# Patient Record
(demographics unavailable — no encounter records)

---

## 2025-01-09 NOTE — HISTORY OF PRESENT ILLNESS
[FreeTextEntry1] : Mr. Guanako Mesa is a 73M with PMH HTN, DM, HLD, CAD on plavix, afib previously on Eliquis who presents today for hospital follow up. To review, he was originally admitted to [facility] 11/21 - 11/23 for R occipital infarct and again 11/29 s/p slip and fall. He then re-presented on 11/30/24 with confusion and AMS. 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 He was transferred to [facility] for evaluation, started on a heparin drip. He was found to have a pressure-dependent exam, improved exam with SBP >160. MRI performed which showed watershed territory infarcts, and MRA NOVA showed decreased velocities. He underwent R petrous carotid angioplasty and stenting on 12/2/24. He tolerated the procedure well. Postop MRI showed stable infarcts, and MRA NOVA showed greatly improved velocities. He was discharged to acute rehab on 12/6/24. He presents today for follow up. He continues on aspirin and plavix, reports compliance. He reports that he feels well, reports his L arm and leg are much stronger but still continues with some L arm weakness. He also admits to eye "strain" but thinks he just needs to see an ophthalmologist. He denies headache, weakness, numbness, tingling, difficulty walking, difficulty speaking, dizziness.

## 2025-01-09 NOTE — ASSESSMENT
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   Plan:  - Continue aspirin and plavix  - Ok to restart Eliquis for afib if required by cardiology, if NOVA stable then will switch to Plavix and Eliquis at that time   - Repeat MRA NOVA in 2 months (March 2025)  - Follow up after imaging performed  - Plan for repeat angiogram in 6 months (June 2025)  - Referral to Dr. Wayne David for neuroophthalmology  - Referral to [facility] for cardiology workup  - Patient and family in agreement with plan.

## 2025-01-09 NOTE — ASSESSMENT
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   Plan:  - Continue aspirin and plavix  - Ok to restart Eliquis for afib if required by cardiology, if NOVA stable then will switch to Plavix and Eliquis at that time   - Repeat MRA NOVA in 2 months (March 2025)  - Follow up after imaging performed  - Plan for repeat angiogram in 6 months (June 2025)  - Referral to Dr. Wayne David for neuroophthalmology  - Referral to [facility] for cardiology workup  - Patient and family in agreement with plan.

## 2025-01-09 NOTE — END OF VISIT
[FreeTextEntry3] : Mr. Mesa was seen in follow-up today after his intracranial angioplasty and stenting for his right petrous cavernous stenosis.  He is doing very well after his physical therapy.  He has very mild left-sided hemiparesis.  He will need a NOVA in 2 months and he will follow-up in the office after that is complete.  I advised him to continue his aspirin and Plavix for his newly placed stent however if he needs to be on anticoagulation for atrial fibrillation he will need to be on triple antithrombotic therapy for at least 2 months until we are able to review the NOVA scan. [Time Spent: ___ minutes] : I have spent [unfilled] minutes of time on the encounter which excludes teaching and separately reported services.

## 2025-01-09 NOTE — ASSESSMENT
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   Plan:  - Continue aspirin and plavix  - Ok to restart Eliquis for afib if required by cardiology, if NOVA stable then will switch to Plavix and Eliquis at that time   - Repeat MRA NOVA in 2 months (March 2025)  - Follow up after imaging performed  - Plan for repeat angiogram in 6 months (June 2025)  - Referral to Dr. Wayne David for neuroophthalmology  - Referral to [facility] for cardiology workup  - Patient and family in agreement with plan.

## 2025-01-09 NOTE — PHYSICAL EXAM
[FreeTextEntry1] : Constitutional: NAD Neuro * Mental Status:  GCS 15: Awake, alert, oriented to conversation. No aphasia or difficulty speaking. No dysarthria. * Cranial Nerves: Cnii-Cnxii grossly intact. EOMI, tongue midline, no gaze deviation, no facial droop * Motor: LUE 4+/5, LLE 5/5, RUE/RLE 5/5  * Sensory: Sensation intact to light touch * Reflexes: not assessed Cardiovascular: Regular rate and rhythm. Eyes: See neurologic examination with detailed examination of eyes. Respiratory: non labored breathing Musculoskeletal: No muscle wasting noted Skin: grossly intact

## 2025-01-09 NOTE — HISTORY OF PRESENT ILLNESS
[FreeTextEntry1] : Mr. Guanako Mesa is a 73M with PMH HTN, DM, HLD, CAD on plavix, afib previously on Eliquis who presents today for hospital follow up. To review, he was originally admitted to [facility] 11/21 - 11/23 for R occipital infarct and again 11/29 s/p slip and fall. He then re-presented on 11/30/24 with confusion and AMS. 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 He was transferred to [facility] for evaluation, started on a heparin drip. He was found to have a pressure-dependent exam, improved exam with SBP >160. MRI performed which showed watershed territory infarcts, and MRA NOVA showed decreased velocities. He underwent R petrous carotid angioplasty and stenting on 12/2/24. He tolerated the procedure well. Postop MRI showed stable infarcts, and MRA NOVA showed greatly improved velocities. He was discharged to acute rehab on 12/6/24. He presents today for follow up. He continues on aspirin and plavix, reports compliance. He reports that he feels well, reports his L arm and leg are much stronger but still continues with some L arm weakness. He also admits to eye "strain" but thinks he just needs to see an ophthalmologist. He denies headache, weakness, numbness, tingling, difficulty walking, difficulty speaking, dizziness.

## 2025-01-09 NOTE — HISTORY OF PRESENT ILLNESS
[FreeTextEntry1] : Mr. Guaanko Mesa is a 73M with PMH HTN, DM, HLD, CAD on plavix, afib previously on Eliquis who presents today for hospital follow up. To review, he was originally admitted to [facility] 11/21 - 11/23 for R occipital infarct and again 11/29 s/p slip and fall. He then re-presented on 11/30/24 with confusion and AMS. 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 He was transferred to [facility] for evaluation, started on a heparin drip. He was found to have a pressure-dependent exam, improved exam with SBP >160. MRI performed which showed watershed territory infarcts, and MRA NOVA showed decreased velocities. He underwent R petrous carotid angioplasty and stenting on 12/2/24. He tolerated the procedure well. Postop MRI showed stable infarcts, and MRA NOVA showed greatly improved velocities. He was discharged to acute rehab on 12/6/24. He presents today for follow up. He continues on aspirin and plavix, reports compliance. He reports that he feels well, reports his L arm and leg are much stronger but still continues with some L arm weakness. He also admits to eye "strain" but thinks he just needs to see an ophthalmologist. He denies headache, weakness, numbness, tingling, difficulty walking, difficulty speaking, dizziness.

## 2025-01-10 NOTE — REVIEW OF SYSTEMS
[Constipation] : constipation [Negative] : Heme/Lymph [Chest Pain] : no chest pain [Palpitations] : no palpitations [Lower Ext Edema] : no lower extremity edema [Orthopena] : no orthopnea [Paroxysmal Nocturnal Dyspnea] : no paroxysmal nocturnal dyspnea [Shortness Of Breath] : no shortness of breath [Wheezing] : no wheezing [Cough] : no cough [Dyspnea on Exertion] : not dyspnea on exertion [Abdominal Pain] : no abdominal pain [Nausea] : no nausea [Vomiting] : no vomiting [Heartburn] : no heartburn [Melena] : no melena [Dysuria] : no dysuria [Incontinence] : no incontinence [Hesitancy] : no hesitancy [Nocturia] : no nocturia [Hematuria] : no hematuria [Frequency] : no frequency [Impotence] : no impotency [Poor Libido] : libido not poor [Joint Pain] : no joint pain [Back Pain] : no back pain [FreeTextEntry7] : uses senna PRN

## 2025-01-10 NOTE — HISTORY OF PRESENT ILLNESS
[House Calls Co-Management Patient] : [unfilled] is a House [employer]s co-management patient [Patient is stable] : patient is stable [Education provided] : education provided [Patient] : patient [Spouse] : spouse [LastPVisitDate] : 12/2024 [FreeTextEntry4] : Dr. Dunn [LastSpecialistVisitDate] : 01/2025 [FreeTextEntry1] : Dr. Bharat Kowalski cardiology Neuro ophthalmology Dr. Wayne Dunn 3/13/25 (neuro Sx)   [FreeTextEntry2] : MO AMES is a 73 year male with a PMHX of stroke, fall, brain bleed, angioplasty, AFib, HTN, bypass surgery, stent placement,    Patient is being seen for new patient   [ ] accompany patient during today's visit.  AOx3, denies SOB, CP or dizziness, no changes in bladder and bowel functions.  Today's visit and Review - 01/10/2025 - Saw Neurosurgeon yesterday - recent angioplasty s/p brain bleed subsequent to a fall caused by a stroke - Recent A1c 7 - Blood labs to be drawn Monday 1/13 - denies numbness and tingling of hands and feet - denies back pain - constipation, uses Senna, advised to try prune juice and Miralax qam for 7 days - Will be obtaining medical records from Dr. Vergara - elevated BP today, advised to inform caridologist or PCP -    # Ambulation - without assistive devices # Cognition and memory: # Mood: # Communication: # Appetite:  <> Dysphagia: # Sleep hygiene: 6-8 hours good sleep, no snoring # BM pattern: stool is soft, no blood in it, constipation (Senna) # Urinary: denies increased frequency, pain with urination, change to color or odor of urine # Skin # Sensory deficits: mildly [place], does not use hearing aids # Compliance   #Social   #DMEs     Review of systems otherwise NEGATIVE.   # Chronic disease     # Specialists Dr. Wayne David (Opthamologist - [place]) Dr. Dunn (Neurosurgeon) Dr. Vergara (Cardiologist - [facility])

## 2025-01-10 NOTE — ADDENDUM
[FreeTextEntry1] : Documented by Mirna Bearden acting as a scribe under Miriam Álvarez NP. 01/10/2025

## 2025-01-10 NOTE — END OF VISIT
[FreeTextEntry3] : Documented by Mirna Bearden acting as a scribe for Miriam Álvarez NP. 01/10/2025   All medical record entries made by the Scribe were at my direction and personally dictated by me on 01/10/2025. I have reviewed the chart and agree that the record accurately reflects my personal performance of the history, physical exam, assessment and plan. I have also personally directed, reviewed, and agreed with the chart.

## 2025-03-13 NOTE — HISTORY OF PRESENT ILLNESS
[FreeTextEntry1] : Mr. Guanako Mesa is a 74M with PMH HTN, DM, HLD, CAD on plavix, afib previously on Eliquis who presents today for follo up R carotid angioplasty and stenting. To review, he was originally admitted to [facility] 11/21 - 11/23 for R occipital infarct and again 11/29 s/p slip and fall. 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 He was transferred to [facility] and was found to have a pressure-dependent exam, improved exam with SBP >160. MRI showed watershed territory infarcts, and MRA NOVA showed R ICA decreased velocities. He underwent R petrous carotid angioplasty and stenting on 12/2/24. He tolerated the procedure well. Postop MRI showed stable infarcts, and MRA NOVA showed greatly improved velocities. He was discharged to acute rehab on 12/6/24. He presents today for follow up. He continues on aspirin and plavix. He reports that he feels well, reports that his L side weakness has improved, denies headache, weakness, numbness, tingling, difficulty walking, difficulty speaking, dizziness. He reports that he has cataract surgery planned for May 8 and May 22.

## 2025-03-13 NOTE — HISTORY OF PRESENT ILLNESS
[FreeTextEntry1] : Mr. Guanako Mesa is a 74M with PMH HTN, DM, HLD, CAD on plavix, afib previously on Eliquis who presents today for follo up R carotid angioplasty and stenting. To review, he was originally admitted to [facility] 11/21 - 11/23 for R occipital infarct and again 11/29 s/p slip and fall. 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 He was transferred to [facility] and was found to have a pressure-dependent exam, improved exam with SBP >160. MRI showed watershed territory infarcts, and MRA NOVA showed R ICA decreased velocities. He underwent R petrous carotid angioplasty and stenting on 12/2/24. He tolerated the procedure well. Postop MRI showed stable infarcts, and MRA NOVA showed greatly improved velocities. He was discharged to acute rehab on 12/6/24. He presents today for follow up. He continues on aspirin and plavix. He reports that he feels well, reports that his L side weakness has improved, denies headache, weakness, numbness, tingling, difficulty walking, difficulty speaking, dizziness. He reports that he has cataract surgery planned for May 8 and May 22.

## 2025-03-13 NOTE — PHYSICAL EXAM
[FreeTextEntry1] : Constitutional: NAD Neuro * Mental Status:  GCS 15: Awake, alert, oriented to conversation. No aphasia or difficulty speaking. No dysarthria. * Cranial Nerves: Cnii-Cnxii grossly intact. EOMI, tongue midline, no gaze deviation, no facial droop * Motor: LUE 4+/5, LUE/RUE/RLE 5/5  * Sensory: Sensation intact to light touch * Reflexes: not assessed Cardiovascular: Regular rate and rhythm. Eyes: See neurologic examination with detailed examination of eyes. Respiratory: non labored breathing Musculoskeletal: No muscle wasting noted Skin: grossly intact

## 2025-03-13 NOTE — ASSESSMENT
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   Plan: - Continue aspirin and plavix - CTA now to assess stent patency  - Repeat cerebral angiogram Monday, April 28, 2025 - PST prior to procedure  - Patient and family in agreement with plan.

## 2025-03-13 NOTE — END OF VISIT
[FreeTextEntry3] : Will obtain 3 months CTA due to difficulties with scheduling a MR NOVA.  He is compliant on aspirin and Plavix.  We will perform cerebral angiography ahead of his planned cataract surgery date due to the need to discontinue aspirin and Plavix in order to ensure no evidence of in-stent stenosis.  The patient was in agreement with this plan and all questions were answered. [Time Spent: ___ minutes] : I have spent [unfilled] minutes of time on the encounter which excludes teaching and separately reported services.

## 2025-04-11 NOTE — PHYSICAL EXAM
[FreeTextEntry1] : Constitutional: NAD Neuro * Mental Status: GCS 15: Awake, alert, oriented to conversation. No aphasia or difficulty speaking. No dysarthria. * Cranial Nerves: Cnii-Cnxii grossly intact. EOMI, tongue midline, no gaze deviation, no facial droop * Motor: LUE 4+/5, LUE/RUE/RLE 5/5 * Sensory: Sensation intact to light touch * Reflexes: not assessed Cardiovascular: Regular rate and rhythm. Eyes: See neurologic examination with detailed examination of eyes. Respiratory: non labored breathing Musculoskeletal: No muscle wasting noted Skin: grossly intact.

## 2025-04-11 NOTE — HISTORY OF PRESENT ILLNESS
[FreeTextEntry1] : Mr. Guanako Mesa is a 74M with PMH HTN, DM, HLD, CAD on plavix, afib previously on Eliquis who presents today for follo up R carotid angioplasty and stenting. To review, he was originally admitted to [facility] 11/21 - 11/23 for R occipital infarct and again 11/29 s/p slip and fall. 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 He was transferred to [facility] and was found to have a pressure-dependent exam, improved exam with SBP >160. MRI showed watershed territory infarcts, and MRA NOVA showed R ICA decreased velocities. He underwent R petrous carotid angioplasty and stenting on 12/2/24. He tolerated the procedure well. Postop MRI showed stable infarcts, and MRA NOVA showed greatly improved velocities. He was discharged to acute rehab on 12/6/24. He presents today for follow up. He continues on aspirin and plavix. He underwent a CTA which showed good flow through the stent without evidence of stenosis. He presents today for follow up of results. He reports that he feels well, continues with some deficits to the L visual field when reading but otherwise denies headache, weakness, numbness, tingling, difficulty walking, difficulty speaking, dizziness. He is compliant on aspirin and plavix.

## 2025-04-11 NOTE — HISTORY OF PRESENT ILLNESS
[FreeTextEntry1] : Mr. Guanako Mesa is a 74M with PMH HTN, DM, HLD, CAD on plavix, afib previously on Eliquis who presents today for follo up R carotid angioplasty and stenting. To review, he was originally admitted to [facility] 11/21 - 11/23 for R occipital infarct and again 11/29 s/p slip and fall. 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 He was transferred to [facility] and was found to have a pressure-dependent exam, improved exam with SBP >160. MRI showed watershed territory infarcts, and MRA NOVA showed R ICA decreased velocities. He underwent R petrous carotid angioplasty and stenting on 12/2/24. He tolerated the procedure well. Postop MRI showed stable infarcts, and MRA NOVA showed greatly improved velocities. He was discharged to acute rehab on 12/6/24. He presents today for follow up. He continues on aspirin and plavix. He underwent a CTA which showed good flow through the stent without evidence of stenosis. He presents today for follow up of results. He reports that he feels well, continues with some deficits to the L visual field when reading but otherwise denies headache, weakness, numbness, tingling, difficulty walking, difficulty speaking, dizziness. He is compliant on aspirin and plavix.

## 2025-04-11 NOTE — ASSESSMENT
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 CTA shows good flow through the stent without evidence of stenosis.   Plan: - Continue aspirin and plavix - Repeat cerebral angiogram, tentatively planned for Monday, April 28, 2025 - PST prior to procedure - Follow up with ophthalmology about visual field deficit - Patient and family in agreement with plan.

## 2025-05-12 NOTE — ASSESSMENT
[FreeTextEntry1] : All preventative measures were reviewed with the patient and the patient is due for and agrees to the following as outlined  in the plan  below. advised tdap, shingrx and prevnar  declined. Appropriate labs were drawn in this office today. agrees to mail in stool crads [Vaccines Reviewed] : Immunizations reviewed today. Please see immunization details in the vaccine log within the immunization flowsheet.

## 2025-05-12 NOTE — REASON FOR VISIT
[Annual Wellness Visit] : an annual wellness visit [FreeTextEntry1] : Pt is he  Pt here for the first   time.re for a physical.

## 2025-05-12 NOTE — HEALTH RISK ASSESSMENT
[No] : In the past 12 months have you used drugs other than those required for medical reasons? No [0] : 2) Feeling down, depressed, or hopeless: Not at all (0) [Never] : Never [No falls in past year] : Patient reported no falls in the past year [PHQ-2 Negative - No further assessment needed] : PHQ-2 Negative - No further assessment needed [Yes] : takes [NO] : No [None] : None [With Family] : lives with family [] :  [Fully functional (bathing, dressing, toileting, transferring, walking, feeding)] : Fully functional (bathing, dressing, toileting, transferring, walking, feeding) [Fully functional (using the telephone, shopping, preparing meals, housekeeping, doing laundry, using] : Fully functional and needs no help or supervision to perform IADLs (using the telephone, shopping, preparing meals, housekeeping, doing laundry, using transportation, managing medications and managing finances) [Smoke Detector] : smoke detector [Carbon Monoxide Detector] : carbon monoxide detector [Seat Belt] :  uses seat belt [Sunscreen] : uses sunscreen [With Patient/Caregiver] : , with patient/caregiver [de-identified] : neg [de-identified] : neg [[id number]] : 0 [EyeExamDate] : 05/25 [Change in mental status noted] : No change in mental status noted [Reports changes in hearing] : Reports no changes in hearing [AdvancecareDate] : 5/12/25

## 2025-05-28 NOTE — ASSESSMENT
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 Repeat angiogram 4/28/25 showed no in-stent stenosis. He presents for follow up.    Plan:  - Continue aspirin 325 mg daily   - Repeat MRA NOVA scan in 6 months (November 2025)  - Follow up after imaging performed  - Patient and family in agreement with plan.

## 2025-05-28 NOTE — HISTORY OF PRESENT ILLNESS
[FreeTextEntry1] : Mr. Guanako Mesa is a 74M with PMH HTN, DM, HLD, CAD on plavix, afib previously on Eliquis who presents today for follow up R carotid angioplasty and stenting. To review, he was originally admitted to [facility] 11/21 - 11/23/24 for R occipital infarct and again 11/29 s/p slip and fall. 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 He was transferred to [facility] and was found to have a pressure-dependent exam, improved exam with SBP >160. MRI showed watershed territory infarcts, and MRA NOVA showed R ICA decreased velocities. He underwent R petrous carotid angioplasty and stenting on 12/2/24. He tolerated the procedure well. Postop MRI showed stable infarcts, and MRA NOVA showed greatly improved velocities. He underwent follow up cerebral angiogram on 4/28/24 which showed no in-stent stenosis with good flow. Plavix was discontinued at that time. He presents for follow up. He reports that he feels well, denies headache, weakness, numbness, tingling, difficulty walking, difficulty speaking, dizziness, groin pain.

## 2025-07-30 NOTE — END OF VISIT
[Time Spent: ___ minutes] : I have spent [unfilled] minutes of time on the encounter which excludes teaching and separately reported services. [FreeTextEntry3] :  All medical record entries made by the Scribe were at my direction and personally dictated by me. I have reviewed the chart and agreed that the record accurately reflects my personal performance of the history, physical exam, assessment and plan. I have also personally directed, reviewed, and agreed with the chart.

## 2025-07-30 NOTE — HISTORY OF PRESENT ILLNESS
[House Calls Co-Management Patient] : [unfilled] is a House Calls co-management patient [In-Place] : has aide services in-place [Private Hire] : private hire [Patient is stable] : patient is stable [Education provided] : education provided [Patient] : patient [Spouse] : spouse [LastPVisitDate] : 5/2025 [FreeTextEntry4] : Dr. Dunn [LastSpecialistVisitDate] : 01/2025 [FreeTextEntry1] : taxing efforts to leave the house due to multiple chronic conditions including recent fall s/p stroke and cranial hemorrhage [FreeTextEntry2] : MO AMES is a 73-year-old male with PMHx of CVA (10 years ago, with mild LUE deficit). Afib on Eliquis and metoprolol. HTN, HLD, CAD s/p CABG x3. recent hospital presentation with CVA 11/20-11/23 at [place], where the patient presented with blurry vision, CTA showed multiple acute and early subacute Rt frontal and parietal, and occipital lobes, and advised to hold anticoagulant due to signs of hemorrhagic conversion. Patient presented at [place] on 11/29 s/p fall and confusion, found to have severe Rt petrous carotid stenosis, revealing severe Rt ICA stenosis and hypoperfusion per MRA NOVA. s/p angioplasty and stenting on 12/2/2025. Patient was treated in Neuro ICU at [facility], subsequently discharged to [facility] rehab on 12/6, then discharged to home on 12/20.  Patient is being seen for a routine follow-up for chronic disease management Spouse accompanied the patient during the visit.   [] Interval event - denies ED visit or fall  [] Today's visit and Review - 07/30/2025 - A&Ox3, denies SOB, CP, or dizziness,  - Headache has been resolved. No repeat MRA done, pending repeat MRA PB. - Fasting glucose , has CGM  - The patient has not had recent blood work but plans to follow up with Dr. Hernandez, PCP, in November for this. - denies numbness and tingling of hands and feet, denies back pain - constipation continues, daily BM, but harder stool, does not take stool softner/medication - Will f/u with Dr. Vergara, cardiologist, next year. Advised to see before the year ends. -Had f/u with PCP (Dr. Hernandez) on 05/12/2025 and Ophthalmologist. Will f/u with podiatry on 08/05/2025, and Endocrinologist on 08/15/2025. - Denies any recent hospital visits, falls, or loss of balance. - Lipid panel and Kidney function reviewed with good results. - Has Eczema since childhood, unmedicated, denies bleeding or skin openings. - Medications reviewed and reconciled.  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   # Chronic disease - Afib : Plavix, ASA and Toprol XL 100mg - HTN: Toprol [id number], Amlodipine 10mg, Losartan 100mg QD - Carotid stenosis s/p stent and angioplasty - HLD : atorvastatin 80mg - CVA : clopidogrel 75mg, aspirin 325mg - DM: metformin 1000mg BID ( A1C 7)   # Specialists Dr. Wayne David (Neuro-Ophthalmology in [place]) Dr. Dunn (Neurosurgeon): next appt on 3/13/2025 Dr. Vergara (Cardiologist - [facility]), now changed to [facility] Dr. Nicholson (PCP)

## 2025-07-30 NOTE — REASON FOR VISIT
[Follow-Up] : a follow-up visit [Spouse] : spouse [Pre-Visit Preparation] : pre-visit preparation was done [Intercurrent Specialty/Sub-specialty Visits] : the patient has no intercurrent specialty/sub-specialty visits [FreeTextEntry2] : CHART REVIEW COMPLETED

## 2025-07-30 NOTE — LETTER HEADER
[Care Plan reviewed and provided to patient/caregiver] : Care plan reviewed and provided to patient/caregiver [Care Plan reviewed every ___ weeks] : Care plan reviewed every [unfilled] weeks [Care Plan managed/Care coordinated by: ___] : Care plan managed/Care coordinated by: [unfilled] [Initiation or substantial revisions made to care plan involving mod/high medical decision making for complex CCM] : Initiation or substantial revisions made to care plan involving mod/high medical decision making for complex CCM [Patient/Caregiver agrees to have other providers send summary of their care to this office] : Patient/caregiver agrees to have other providers send summary of their care to this office [FreeTextEntry5] : Rising risk, patient continues working as self-employed CPA, able to follow up with community PCP and specialty MDs

## 2025-07-30 NOTE — PHYSICAL EXAM
[No Acute Distress] : no acute distress [Normal Sclera/Conjunctiva] : normal sclera/conjunctiva [EOMI] : extra ocular movement intact [Normal Outer Ear/Nose] : the ears and nose were normal in appearance [Normal Oropharynx] : the oropharynx was normal [No Respiratory Distress] : no respiratory distress [Clear to Auscultation] : lungs were clear to auscultation bilaterally [No Accessory Muscle Use] : no accessory muscle use [Normal Rate] : heart rate was normal  [Regular Rhythm] : with a regular rhythm [Normal S1, S2] : normal S1 and S2 [No Murmurs] : no murmurs heard [No Edema] : there was no peripheral edema [Normal Bowel Sounds] : normal bowel sounds [Non Tender] : non-tender [Soft] : abdomen soft [Not Distended] : not distended [Normal Post Cervical Nodes] : no posterior cervical lymphadenopathy [No CVA Tenderness] : no ~M costovertebral angle tenderness [No Spinal Tenderness] : no spinal tenderness [Normal Gait] : normal gait [No Joint Swelling] : no joint swelling seen [No Clubbing, Cyanosis] : no clubbing  or cyanosis of the fingernails [Normal Strength/Tone] : muscle strength and tone were normal [No Rash] : no rash [No Skin Lesions] : no skin lesions [Cranial Nerves Intact] : cranial nerves 2-12 were intact [No Motor Deficits] : the motor exam was normal [No Gross Sensory Deficits] : no gross sensory deficits [Oriented x3] : oriented to person, place, and time [Normal Affect] : the affect was normal [Normal Mood] : the mood was normal [Normal Insight/Judgement] : insight and judgment were intact [Normal Anterior Cervical Nodes] : anterior cervical lymphadenopathy

## 2025-07-30 NOTE — COUNSELING
[DASH diet recommended] : DASH diet recommended [Sodium restriction 2gm recommended] : sodium restriction 2 gm recommended [Continue diet as tolerated] : continue diet as tolerated based on goals of care [Non - Smoker] : non-smoker [Smoke/CO Detectors] : smoke/CO detectors [Use grab bars] : use grab bars [Use assistive device to avoid falls] : use assistive device to avoid falls [Remove clutter and unsafe carpeting to avoid falls] : remove clutter and unsafe carpeting to avoid falls [] : foot exam [Completed] : Aspirin use discussion completed [Decrease stress] : decrease stress [Decrease hospital use] : decrease hospital use [Minimize unnecessary interventions] : minimize unnecessary interventions [Maintain functional ability] : maintain functional ability [Discussed disease trajectory with patient/caregiver] : discussed disease trajectory with patient/caregiver [Likely to achieve goals/desired outcomes] : likely to achieve goals/desired outcomes [Patient/Caregiver has ___ understanding of disease process] : patient/caregiver has [unfilled] understanding of disease process [Patient/Caregiver not ready to engage in discussion] : patient/caregiver not ready to engage in discussion [Full Code] : Code Status: Full Code [[facility] Date: _____] [facility]last verification date: [unfilled] [_____] : HCP: [unfilled]

## 2025-07-30 NOTE — ASSESSMENT
[FreeTextEntry1] : MO AMES is a 73-year male with PMHx of CVA (10years ago, with mild LUE deficit). Afib on Eliquis and metoprolol. HTN, HLD, CAD s/p CABG x3. recent hospital presentation with CVA 11/20-11/23 at [place], where patient presented with blurry vision, CTA showed multiple acute and early subacute Rt frontal and parietal and occipital lobes and advised to hold anticoagulant due to signs of hemorrhagic conversion. Patient presented at [place] on 11/29 s/p fall and confusion, found to have severe Rt petrous carotid stenosis, revealing severe Rt ICA stenosis and hypoperfusion per MRA NOVA. s/p angioplasty and stenting on 12/2/2025. Patient was treated in Neuro ICU at [facility], subsequently discharged to [facility] rehab on 12/6, then discharged to home on 12/20.  Patient is being seen for an initial enrollment visit to [facility] House Calls Program HHA, spouse, House Call ZANDER Nguyen accompanied the patient during the visit.   [] Today's visit and Review - 7/30/2025 - AOx3, denies SOB, CP or dizziness, - Saw Neurosurgeon yesterday: Pending repeat MRA PB in March - recent hospitalization and acute rehab admission due to CVA s/p brain bleed subsequent to a fall caused by a stroke - Recent A1c 7 - Blood labs to be drawn Monday 1/13 per PCP - denies numbness and tingling of hands and feet - denies back pain - constipation, uses Senna, advised to try prune juice and Miralax Qam for 7 days - Will be obtaining medical records from Dr. Vergara - elevated BP today, advised to inform cardiologist or PCP  [] HTN - chronic, hypertensive on exam - continue amlodipine 10mg, Losartan 100mg and Toprol XL 100mg - continue monitoring BP daily and advised to keep a log prior to Cardiologist's visit  [] HLD/ Rt carortid artery stenosis - chronic, stable - s/p stent and agioplasty - continue atrovastatin 75mg, ASA 325mg and Plavix 75mg  - labs have been drawn by PCP, Dr. Nicholson  [] h/o CVA - chronic, stable - 10years ago and recent CVA presented with blurry vision, noted to have acute and dearly subacute  - continue atorvastatin, Plavix, and ASA 325mg, Off Eliquis due to the hemorrhagic conversion  [] DM - chronic, stable A1C 7  - continue metformin 1000mg BID - continue diabetic diet  [] Afib - chronic, stbale, rate controlled on exam - continue Toprol XL 100mg , Plavix 75mg, off Eliquis  We reviewed all medications at length with the patient/caregiver and addressed all questions. We discussed worsening symptoms with the patient /caregiver and verbalized understanding that there are no issues or concerns at this time. The patient/caregiver is encouraged to call the House Calls 24-hour number with any questions, concerns, or issues.

## 2025-07-30 NOTE — HISTORY OF PRESENT ILLNESS
[House Calls Co-Management Patient] : [unfilled] is a House Calls co-management patient [In-Place] : has aide services in-place [Private Hire] : private hire [Patient is stable] : patient is stable [Education provided] : education provided [Patient] : patient [Spouse] : spouse [LastPVisitDate] : 5/2025 [FreeTextEntry4] : Dr. Dunn [LastSpecialistVisitDate] : 01/2025 [FreeTextEntry1] : taxing efforts to leave the house due to multiple chronic conditions including recent fall s/p stroke and cranial hemorrhage [FreeTextEntry2] : MO AMES is a 73-year-old male with PMHx of CVA (10 years ago, with mild LUE deficit). Afib on Eliquis and metoprolol. HTN, HLD, CAD s/p CABG x3. recent hospital presentation with CVA 11/20-11/23 at [place], where the patient presented with blurry vision, CTA showed multiple acute and early subacute Rt frontal and parietal, and occipital lobes, and advised to hold anticoagulant due to signs of hemorrhagic conversion. Patient presented at [place] on 11/29 s/p fall and confusion, found to have severe Rt petrous carotid stenosis, revealing severe Rt ICA stenosis and hypoperfusion per MRA NOVA. s/p angioplasty and stenting on 12/2/2025. Patient was treated in Neuro ICU at [facility], subsequently discharged to [facility] rehab on 12/6, then discharged to home on 12/20.  Patient is being seen for a routine follow-up for chronic disease management Spouse accompanied the patient during the visit.   [] Interval event - denies ED visit or fall  [] Today's visit and Review - 07/30/2025 - A&Ox3, denies SOB, CP, or dizziness,  - Headache has been resolved. No repeat MRA done, pending repeat MRA PB. - Fasting glucose , has CGM  - The patient has not had recent blood work but plans to follow up with Dr. Hernandez, PCP, in November for this. - denies numbness and tingling of hands and feet, denies back pain - constipation continues, daily BM, but harder stool, does not take stool softner/medication - Will f/u with Dr. Vergara, cardiologist, next year. Advised to see before the year ends. -Had f/u with PCP (Dr. Hernandez) on 05/12/2025 and Ophthalmologist. Will f/u with podiatry on 08/05/2025, and Endocrinologist on 08/15/2025. - Denies any recent hospital visits, falls, or loss of balance. - Lipid panel and Kidney function reviewed with good results. - Has Eczema since childhood, unmedicated, denies bleeding or skin openings. - Medications reviewed and reconciled.  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   # Chronic disease - Afib : Plavix, ASA and Toprol XL 100mg - HTN: Toprol [id number], Amlodipine 10mg, Losartan 100mg QD - Carotid stenosis s/p stent and angioplasty - HLD : atorvastatin 80mg - CVA : clopidogrel 75mg, aspirin 325mg - DM: metformin 1000mg BID ( A1C 7)   # Specialists Dr. Wayne David (Neuro-Ophthalmology in [place]) Dr. Dunn (Neurosurgeon): next appt on 3/13/2025 Dr. Vergara (Cardiologist - [facility]), now changed to [facility] Dr. Nicholson (PCP)

## 2025-07-30 NOTE — ASSESSMENT
[FreeTextEntry1] : MO AMES is a 73-year male with PMHx of CVA (10years ago, with mild LUE deficit). Afib on Eliquis and metoprolol. HTN, HLD, CAD s/p CABG x3. recent hospital presentation with CVA 11/20-11/23 at [place], where patient presented with blurry vision, CTA showed multiple acute and early subacute Rt frontal and parietal and occipital lobes and advised to hold anticoagulant due to signs of hemorrhagic conversion. Patient presented at [place] on 11/29 s/p fall and confusion, found to have severe Rt petrous carotid stenosis, revealing severe Rt ICA stenosis and hypoperfusion per MRA NOVA. s/p angioplasty and stenting on 12/2/2025. Patient was treated in Neuro ICU at [facility], subsequently discharged to [facility] rehab on 12/6, then discharged to home on 12/20.  Patient is being seen for an initial enrollment visit to [facility] House Calls Program HHA, spouse, House Call ZANDER Nguyen accompanied the patient during the visit.   [] Today's visit and Review - 7/30/2025 - AOx3, denies SOB, CP or dizziness, - Saw Neurosurgeon yesterday: Pending repeat MRA PB in March - recent hospitalization and acute rehab admission due to CVA s/p brain bleed subsequent to a fall caused by a stroke - Recent A1c 7 - Blood labs to be drawn Monday 1/13 per PCP - denies numbness and tingling of hands and feet - denies back pain - constipation, uses Senna, advised to try prune juice and Miralax Qam for 7 days - Will be obtaining medical records from Dr. Vergara - elevated BP today, advised to inform cardiologist or PCP  [] HTN - chronic, hypertensive on exam - continue amlodipine 10mg, Losartan 100mg and Toprol XL 100mg - continue monitoring BP daily and advised to keep a log prior to Cardiologist's visit  [] HLD/ Rt carortid artery stenosis - chronic, stable - s/p stent and agioplasty - continue atrovastatin 75mg, ASA 325mg and Plavix 75mg  - labs have been drawn by PCP, Dr. Nicholson  [] h/o CVA - chronic, stable - 10years ago and recent CVA presented with blurry vision, noted to have acute and dearly subacute  - continue atorvastatin, Plavix, and ASA 325mg, Off Eliquis due to the hemorrhagic conversion  [] DM - chronic, stable A1C 7  - continue metformin 1000mg BID - continue diabetic diet  [] Afib - chronic, stbale, rate controlled on exam - continue Toprol XL 100mg , Plavix 75mg, off Eliquis  We reviewed all medications at length with the patient/caregiver and addressed all questions. We discussed worsening symptoms with the patient /caregiver and verbalized understanding that there are no issues or concerns at this time. The patient/caregiver is encouraged to call the House [facility] 24-hour number with any questions, concerns, or issues.

## 2025-07-30 NOTE — HEALTH RISK ASSESSMENT
[HRA Reviewed] : Health risk assessment reviewed [Independent] : managing finances [Some assistance needed] : managing medications [Full assistance needed] : doing laundry [Any fall with injury in past year] : Patient reported fall with injury in the past year [Yes] : The patient does have visual impairment
